# Patient Record
Sex: MALE | Race: BLACK OR AFRICAN AMERICAN | NOT HISPANIC OR LATINO | Employment: FULL TIME | URBAN - METROPOLITAN AREA
[De-identification: names, ages, dates, MRNs, and addresses within clinical notes are randomized per-mention and may not be internally consistent; named-entity substitution may affect disease eponyms.]

---

## 2018-02-15 ENCOUNTER — CLINICAL SUPPORT (OUTPATIENT)
Dept: OCCUPATIONAL MEDICINE | Facility: CLINIC | Age: 33
End: 2018-02-15

## 2018-02-15 PROCEDURE — 99499 UNLISTED E&M SERVICE: CPT | Mod: S$GLB,,, | Performed by: PREVENTIVE MEDICINE

## 2018-02-15 PROCEDURE — 80305 DRUG TEST PRSMV DIR OPT OBS: CPT | Mod: S$GLB,,, | Performed by: PREVENTIVE MEDICINE

## 2018-02-15 PROCEDURE — 99199 UNLISTED SPECIAL SVC PX/RPRT: CPT | Mod: S$GLB,,, | Performed by: PREVENTIVE MEDICINE

## 2024-03-02 ENCOUNTER — HOSPITAL ENCOUNTER (EMERGENCY)
Facility: HOSPITAL | Age: 39
Discharge: HOME | End: 2024-03-02
Attending: EMERGENCY MEDICINE
Payer: COMMERCIAL

## 2024-03-02 VITALS
BODY MASS INDEX: 25.46 KG/M2 | OXYGEN SATURATION: 99 % | HEIGHT: 68 IN | RESPIRATION RATE: 16 BRPM | DIASTOLIC BLOOD PRESSURE: 74 MMHG | SYSTOLIC BLOOD PRESSURE: 112 MMHG | HEART RATE: 74 BPM | WEIGHT: 168 LBS | TEMPERATURE: 98 F

## 2024-03-02 DIAGNOSIS — K64.9 HEMORRHOIDS, UNSPECIFIED HEMORRHOID TYPE: Primary | ICD-10-CM

## 2024-03-02 PROCEDURE — 99283 EMERGENCY DEPT VISIT LOW MDM: CPT

## 2024-03-02 RX ORDER — HYDROCORTISONE ACETATE 25 MG/1
25 SUPPOSITORY RECTAL 2 TIMES DAILY
Qty: 20 SUPPOSITORY | Refills: 0 | Status: SHIPPED | OUTPATIENT
Start: 2024-03-02 | End: 2024-03-06 | Stop reason: CLARIF

## 2024-03-02 NOTE — DISCHARGE INSTRUCTIONS
You can use positive worse twice a day to help reduce the size of hemorrhoids and swelling.  Take Metamucil daily to help with soft bowel movements.  Avoid straining or sitting on the toilet for long periods of time.  Follow up with primary care or general surgery.

## 2024-03-02 NOTE — ED PROVIDER NOTES
Encounter Date: 3/2/2024       History     Chief Complaint   Patient presents with    Abdominal Pain     Patient to abdominal pain and rectal pain onset two weeks ago.     39-year-old male with no significant past medical history presents to ED with reports of rectal pain that started couple of months ago.  Reports symptoms are intermittent and worse with straining to have a bowel movement.  Reported did notice some bleeding intermittently.  No improvement with over-the-counter hemorrhoid medication.  No relieving factors.    The history is provided by the patient.     Review of patient's allergies indicates:  No Known Allergies  History reviewed. No pertinent past medical history.  History reviewed. No pertinent surgical history.  History reviewed. No pertinent family history.  Social History     Tobacco Use    Smoking status: Every Day     Types: Cigars   Substance Use Topics    Drug use: Yes     Types: Marijuana     Review of Systems   Constitutional:  Negative for fever.   HENT:  Negative for sore throat.    Eyes: Negative.    Respiratory:  Negative for shortness of breath.    Cardiovascular:  Negative for chest pain.   Gastrointestinal:  Positive for abdominal pain, blood in stool and rectal pain. Negative for nausea and vomiting.   Endocrine: Negative.    Genitourinary:  Negative for dysuria.   Musculoskeletal:  Negative for back pain.   Skin:  Negative for rash.   Allergic/Immunologic: Negative.    Neurological:  Negative for weakness.   Hematological:  Does not bruise/bleed easily.   Psychiatric/Behavioral: Negative.         Physical Exam     Initial Vitals [03/02/24 1317]   BP Pulse Resp Temp SpO2   101/66 61 18 98 °F (36.7 °C) 100 %      MAP       --         Physical Exam    Nursing note and vitals reviewed.  Constitutional: He appears well-developed and well-nourished.   HENT:   Head: Normocephalic and atraumatic.   Eyes: EOM are normal.   Neck: Neck supple.   Normal range of motion.  Cardiovascular:   Normal rate and regular rhythm.           Pulmonary/Chest: No respiratory distress.   Abdominal: Abdomen is soft. He exhibits no distension. There is no abdominal tenderness.   Musculoskeletal:         General: Normal range of motion.      Cervical back: Normal range of motion and neck supple.     Neurological: He is alert and oriented to person, place, and time.   Skin: Skin is warm and dry.   Psychiatric: He has a normal mood and affect. Thought content normal.         ED Course   Procedures  Labs Reviewed - No data to display       Imaging Results    None          Medications - No data to display  Medical Decision Making  39-year-old male with no significant past medical history to ED for above complaints.  Small external hemorrhoid noted.  No active bleeding.  No anal fissures noted.  Abdomen was soft and nontender.  Will treat with Anusol and Metamucil.  Return precautions given.  Patient follow up with primary care or general surgery.    Risk  OTC drugs.  Prescription drug management.                                      Clinical Impression:  Final diagnoses:  [K64.9] Hemorrhoids, unspecified hemorrhoid type (Primary)          ED Disposition Condition    Discharge Stable          ED Prescriptions       Medication Sig Dispense Start Date End Date Auth. Provider    hydrocortisone (ANUSOL-HC) 25 mg suppository Place 1 suppository (25 mg total) rectally 2 (two) times daily. for 10 days 20 suppository 3/2/2024 3/12/2024 Danny Stone NP    psyllium (HYDROCIL) packet Take 1 packet by mouth once daily. 30 packet 3/2/2024 -- Danny Stone NP          Follow-up Information       Follow up With Specialties Details Why Contact [facility], Internal Medicine, Gynecology, Dental General Practice   [address] [facility] [address]  [phone]      Tiffany Johnston, DO Family Medicine   [address]  [address]  [facility] [address]  [phone]      Mariella  [facility]EL[facility]   [address]   [address] [address]  [phone]      Katie Hernandez MD General Surgery   [address] [facility] [address]  [phone]               Danny Stone NP  03/02/24 3297

## 2024-03-06 ENCOUNTER — OFFICE VISIT (OUTPATIENT)
Dept: PRIMARY CARE CLINIC | Facility: CLINIC | Age: 39
End: 2024-03-06
Payer: COMMERCIAL

## 2024-03-06 ENCOUNTER — LAB VISIT (OUTPATIENT)
Dept: LAB | Facility: HOSPITAL | Age: 39
End: 2024-03-06
Attending: NURSE PRACTITIONER
Payer: COMMERCIAL

## 2024-03-06 VITALS
SYSTOLIC BLOOD PRESSURE: 101 MMHG | WEIGHT: 169.19 LBS | DIASTOLIC BLOOD PRESSURE: 68 MMHG | HEART RATE: 73 BPM | HEIGHT: 69 IN | TEMPERATURE: 98 F | BODY MASS INDEX: 25.06 KG/M2 | OXYGEN SATURATION: 100 % | RESPIRATION RATE: 16 BRPM

## 2024-03-06 DIAGNOSIS — Z76.89 ENCOUNTER TO ESTABLISH CARE: Primary | ICD-10-CM

## 2024-03-06 DIAGNOSIS — Z13.1 SCREENING FOR DIABETES MELLITUS (DM): ICD-10-CM

## 2024-03-06 DIAGNOSIS — Z11.59 ENCOUNTER FOR HEPATITIS C SCREENING TEST FOR LOW RISK PATIENT: ICD-10-CM

## 2024-03-06 DIAGNOSIS — K64.8 OTHER HEMORRHOIDS: ICD-10-CM

## 2024-03-06 DIAGNOSIS — Z13.0 SCREENING FOR IRON DEFICIENCY ANEMIA: ICD-10-CM

## 2024-03-06 DIAGNOSIS — Z13.220 LIPID SCREENING: ICD-10-CM

## 2024-03-06 DIAGNOSIS — Z13.21 ENCOUNTER FOR VITAMIN DEFICIENCY SCREENING: ICD-10-CM

## 2024-03-06 DIAGNOSIS — Z11.4 ENCOUNTER FOR SCREENING FOR HIV: ICD-10-CM

## 2024-03-06 DIAGNOSIS — Z13.29 THYROID DISORDER SCREEN: ICD-10-CM

## 2024-03-06 DIAGNOSIS — E55.9 VITAMIN D DEFICIENCY: ICD-10-CM

## 2024-03-06 LAB
25(OH)D3+25(OH)D2 SERPL-MCNC: 10 NG/ML (ref 30–96)
ALBUMIN SERPL BCP-MCNC: 4.1 G/DL (ref 3.5–5.2)
ALP SERPL-CCNC: 62 U/L (ref 55–135)
ALT SERPL W/O P-5'-P-CCNC: 18 U/L (ref 10–44)
ANION GAP SERPL CALC-SCNC: 2 MMOL/L (ref 3–11)
AST SERPL-CCNC: 12 U/L (ref 10–40)
BASOPHILS # BLD AUTO: 0.02 K/UL (ref 0–0.2)
BASOPHILS NFR BLD: 0.4 % (ref 0–1.9)
BILIRUB SERPL-MCNC: 0.6 MG/DL (ref 0.1–1)
BUN SERPL-MCNC: 8 MG/DL (ref 6–20)
CALCIUM SERPL-MCNC: 9.8 MG/DL (ref 8.7–10.5)
CHLORIDE SERPL-SCNC: 109 MMOL/L (ref 95–110)
CHOLEST SERPL-MCNC: 155 MG/DL (ref 120–199)
CHOLEST/HDLC SERPL: 2.7 {RATIO} (ref 2–5)
CO2 SERPL-SCNC: 29 MMOL/L (ref 23–29)
CREAT SERPL-MCNC: 1.1 MG/DL (ref 0.5–1.4)
DIFFERENTIAL METHOD BLD: ABNORMAL
EOSINOPHIL # BLD AUTO: 0.1 K/UL (ref 0–0.5)
EOSINOPHIL NFR BLD: 1.8 % (ref 0–8)
ERYTHROCYTE [DISTWIDTH] IN BLOOD BY AUTOMATED COUNT: 12.1 % (ref 11.5–14.5)
EST. GFR  (NO RACE VARIABLE): >60 ML/MIN/1.73 M^2
ESTIMATED AVG GLUCOSE: 100 MG/DL (ref 68–131)
GLUCOSE SERPL-MCNC: 92 MG/DL (ref 70–110)
HBA1C MFR BLD: 5.1 % (ref 4–5.6)
HCT VFR BLD AUTO: 42.8 % (ref 40–54)
HCV AB SERPL QL IA: NORMAL
HDLC SERPL-MCNC: 57 MG/DL (ref 40–75)
HDLC SERPL: 36.8 % (ref 20–50)
HGB BLD-MCNC: 14.8 G/DL (ref 14–18)
HIV 1+2 AB+HIV1 P24 AG SERPL QL IA: NORMAL
IMM GRANULOCYTES # BLD AUTO: 0 K/UL (ref 0–0.04)
IMM GRANULOCYTES NFR BLD AUTO: 0 % (ref 0–0.5)
LDLC SERPL CALC-MCNC: 86.2 MG/DL (ref 63–159)
LYMPHOCYTES # BLD AUTO: 1.5 K/UL (ref 1–4.8)
LYMPHOCYTES NFR BLD: 30.5 % (ref 18–48)
MCH RBC QN AUTO: 33.6 PG (ref 27–31)
MCHC RBC AUTO-ENTMCNC: 34.6 G/DL (ref 32–36)
MCV RBC AUTO: 97 FL (ref 82–98)
MONOCYTES # BLD AUTO: 0.2 K/UL (ref 0.3–1)
MONOCYTES NFR BLD: 4.9 % (ref 4–15)
NEUTROPHILS # BLD AUTO: 3 K/UL (ref 1.8–7.7)
NEUTROPHILS NFR BLD: 62.4 % (ref 38–73)
NONHDLC SERPL-MCNC: 98 MG/DL
NRBC BLD-RTO: 0 /100 WBC
PLATELET # BLD AUTO: 220 K/UL (ref 150–450)
PMV BLD AUTO: 9.7 FL (ref 9.2–12.9)
POTASSIUM SERPL-SCNC: 4.2 MMOL/L (ref 3.5–5.1)
PROT SERPL-MCNC: 7.6 G/DL (ref 6–8.4)
RBC # BLD AUTO: 4.41 M/UL (ref 4.6–6.2)
SODIUM SERPL-SCNC: 140 MMOL/L (ref 136–145)
TRIGL SERPL-MCNC: 59 MG/DL (ref 30–150)
TSH SERPL DL<=0.005 MIU/L-ACNC: 0.96 UIU/ML (ref 0.4–4)
WBC # BLD AUTO: 4.88 K/UL (ref 3.9–12.7)

## 2024-03-06 PROCEDURE — 99999 PR PBB SHADOW E&M-EST. PATIENT-LVL III: CPT | Mod: PBBFAC,,, | Performed by: NURSE PRACTITIONER

## 2024-03-06 PROCEDURE — 80061 LIPID PANEL: CPT | Performed by: NURSE PRACTITIONER

## 2024-03-06 PROCEDURE — 84443 ASSAY THYROID STIM HORMONE: CPT | Performed by: NURSE PRACTITIONER

## 2024-03-06 PROCEDURE — 82306 VITAMIN D 25 HYDROXY: CPT | Performed by: NURSE PRACTITIONER

## 2024-03-06 PROCEDURE — 1160F RVW MEDS BY RX/DR IN RCRD: CPT | Mod: CPTII,S$GLB,, | Performed by: NURSE PRACTITIONER

## 2024-03-06 PROCEDURE — 99203 OFFICE O/P NEW LOW 30 MIN: CPT | Mod: S$GLB,,, | Performed by: NURSE PRACTITIONER

## 2024-03-06 PROCEDURE — 3078F DIAST BP <80 MM HG: CPT | Mod: CPTII,S$GLB,, | Performed by: NURSE PRACTITIONER

## 2024-03-06 PROCEDURE — 87389 HIV-1 AG W/HIV-1&-2 AB AG IA: CPT | Performed by: NURSE PRACTITIONER

## 2024-03-06 PROCEDURE — 80053 COMPREHEN METABOLIC PANEL: CPT | Performed by: NURSE PRACTITIONER

## 2024-03-06 PROCEDURE — 1159F MED LIST DOCD IN RCRD: CPT | Mod: CPTII,S$GLB,, | Performed by: NURSE PRACTITIONER

## 2024-03-06 PROCEDURE — 86803 HEPATITIS C AB TEST: CPT | Performed by: NURSE PRACTITIONER

## 2024-03-06 PROCEDURE — 3008F BODY MASS INDEX DOCD: CPT | Mod: CPTII,S$GLB,, | Performed by: NURSE PRACTITIONER

## 2024-03-06 PROCEDURE — 83036 HEMOGLOBIN GLYCOSYLATED A1C: CPT | Performed by: NURSE PRACTITIONER

## 2024-03-06 PROCEDURE — 85025 COMPLETE CBC W/AUTO DIFF WBC: CPT | Performed by: NURSE PRACTITIONER

## 2024-03-06 PROCEDURE — 3074F SYST BP LT 130 MM HG: CPT | Mod: CPTII,S$GLB,, | Performed by: NURSE PRACTITIONER

## 2024-03-06 RX ORDER — HYDROCORTISONE ACETATE PRAMOXINE HCL 2.5; 1 G/100G; G/100G
CREAM TOPICAL 3 TIMES DAILY
Qty: 30 G | Refills: 2 | Status: ON HOLD | OUTPATIENT
Start: 2024-03-06 | End: 2024-05-20 | Stop reason: HOSPADM

## 2024-03-06 RX ORDER — HYDROCORTISONE ACETATE PRAMOXINE HCL 1; 1 G/100G; G/100G
CREAM TOPICAL 3 TIMES DAILY
Qty: 30 G | Refills: 2 | Status: SHIPPED | OUTPATIENT
Start: 2024-03-06 | End: 2024-03-06 | Stop reason: CLARIF

## 2024-03-06 NOTE — PROGRESS NOTES
[facility] Note    HPI:  Lb Soto is a 39 y.o. male who presents today for Establish Care and Hemorrhoids (Pt here to establish care/ hemorrhoid)      Review of Systems   Constitutional: Negative.    HENT: Negative.     Eyes: Negative.    Respiratory: Negative.     Cardiovascular: Negative.    Gastrointestinal: Negative.         Hemorrhoids   Genitourinary: Negative.    Musculoskeletal: Negative.    Skin: Negative.    Neurological: Negative.    Endo/Heme/Allergies: Negative.    Psychiatric/Behavioral: Negative.        A review of systems was performed and was negative except as noted above.    I personally reviewed allergies, past medical, surgical, social and family history and updated as appropriate.    Medications:    Current Outpatient Medications:     hydrocortisone (ANUSOL-HC) 25 mg suppository, Place 1 suppository (25 mg total) rectally 2 (two) times daily. for 10 days, Disp: 20 suppository, Rfl: 0    psyllium (HYDROCIL) packet, Take 1 packet by mouth once daily., Disp: 30 packet, Rfl: 0    pramoxine-hydrocortisone (PROCTOCREAM-HC) 1-1 % rectal cream, Place rectally 3 (three) times daily. for 7 days, Disp: 30 g, Rfl: 2     Health Maintenance:    There is no immunization history on file for this patient.   Health Maintenance   Topic Date Due    Hepatitis C Screening  Never done    Lipid Panel  Never done    TETANUS VACCINE  Never done     The patient has no Health Maintenance topics of status Not Due  Health Maintenance Due   Topic Date Due    Hepatitis C Screening  Never done    Lipid Panel  Never done    COVID-19 Vaccine (1) Never done    Pneumococcal Vaccines (Age 0-64) (1 of 2 - PCV) Never done    HIV Screening  Never done    TETANUS VACCINE  Never done    Influenza Vaccine (1) Never done       PHYSICAL EXAM:  Vitals:    03/06/24 0816   BP: 101/68   BP Location: Left arm   Patient Position: Sitting   BP Method: Large (Automatic)   Pulse: 73   Resp: 16   Temp: 98.1 °F (36.7  "°C)   TempSrc: Oral   SpO2: 100%   Weight: 76.7 kg (169 lb 3.2 oz)   Height: 5' 9" (1.753 m)     Body mass index is 24.99 kg/m².  Physical Exam  Vitals and nursing note reviewed.   Constitutional:       Appearance: Normal appearance. He is normal weight.   HENT:      Head: Normocephalic.      Right Ear: Tympanic membrane, ear canal and external ear normal.      Left Ear: Tympanic membrane, ear canal and external ear normal.      Nose: Nose normal.      Mouth/Throat:      Mouth: Mucous membranes are moist.   Cardiovascular:      Rate and Rhythm: Normal rate and regular rhythm.      Pulses: Normal pulses.      Heart sounds: Normal heart sounds.   Pulmonary:      Effort: Pulmonary effort is normal.      Breath sounds: Normal breath sounds.   Abdominal:      General: Abdomen is flat.   Musculoskeletal:         General: Normal range of motion.      Cervical back: Normal range of motion.   Skin:     General: Skin is warm and dry.   Neurological:      General: No focal deficit present.      Mental Status: He is alert and oriented to person, place, and time.          ASSESSMENT/PLAN:  1. Encounter to establish care    2. Other hemorrhoids  -     pramoxine-hydrocortisone (PROCTOCREAM-HC) 1-1 % rectal cream; Place rectally 3 (three) times daily. for 7 days  Dispense: 30 g; Refill: 2    3. Encounter for hepatitis C screening test for low risk patient  -     Hepatitis C Antibody; Future; Expected date: 03/06/2024    4. Encounter for screening for HIV  -     HIV 1/2 Ag/Ab (4th Gen); Future; Expected date: 03/06/2024    5. Screening for iron deficiency anemia  -     CBC Auto Differential; Future; Expected date: 03/06/2024    6. Lipid screening  -     Lipid Panel; Future; Expected date: 03/06/2024    7. Thyroid disorder screen  -     TSH; Future; Expected date: 03/06/2024    8. Screening for diabetes mellitus (DM)  -     Comprehensive Metabolic Panel; Future; Expected date: 03/06/2024  -     Hemoglobin A1C; Future; Expected date: " 03/06/2024    9. Encounter for vitamin deficiency screening  -     Misc Sendout Test, Blood Vitamin D; Future; Expected date: 03/06/2024        Other than changes above, continue current medications and maintain follow up with specialists.      Follow up in about 6 months (around 9/6/2024).   No results found for this or any previous visit (from the past 2016 hour(s)).      Madalyn Wolff, VERA  [facility]

## 2024-03-07 DIAGNOSIS — E55.9 VITAMIN D DEFICIENCY: Primary | ICD-10-CM

## 2024-03-07 RX ORDER — ERGOCALCIFEROL 1.25 MG/1
50000 CAPSULE ORAL
Qty: 4 CAPSULE | Refills: 1 | Status: SHIPPED | OUTPATIENT
Start: 2024-03-07 | End: 2024-05-06

## 2024-03-15 DIAGNOSIS — K64.8 OTHER HEMORRHOIDS: Primary | ICD-10-CM

## 2024-03-21 DIAGNOSIS — K64.8 OTHER HEMORRHOIDS: Primary | ICD-10-CM

## 2024-03-21 RX ORDER — HYDROCORTISONE 25 MG/G
CREAM TOPICAL 2 TIMES DAILY
Qty: 28 G | Refills: 0 | Status: SHIPPED | OUTPATIENT
Start: 2024-03-21 | End: 2024-05-13 | Stop reason: SDUPTHER

## 2024-03-22 ENCOUNTER — OFFICE VISIT (OUTPATIENT)
Dept: SURGERY | Facility: CLINIC | Age: 39
End: 2024-03-22
Payer: COMMERCIAL

## 2024-03-22 VITALS
BODY MASS INDEX: 24.59 KG/M2 | HEART RATE: 66 BPM | DIASTOLIC BLOOD PRESSURE: 56 MMHG | OXYGEN SATURATION: 95 % | WEIGHT: 166 LBS | SYSTOLIC BLOOD PRESSURE: 106 MMHG | HEIGHT: 69 IN

## 2024-03-22 DIAGNOSIS — K64.8 OTHER HEMORRHOIDS: ICD-10-CM

## 2024-03-22 DIAGNOSIS — K60.2 ANAL FISSURE: Primary | ICD-10-CM

## 2024-03-22 PROCEDURE — 99999 PR PBB SHADOW E&M-EST. PATIENT-LVL IV: CPT | Mod: PBBFAC,,, | Performed by: STUDENT IN AN ORGANIZED HEALTH CARE EDUCATION/TRAINING PROGRAM

## 2024-03-22 PROCEDURE — 1159F MED LIST DOCD IN RCRD: CPT | Mod: CPTII,S$GLB,, | Performed by: STUDENT IN AN ORGANIZED HEALTH CARE EDUCATION/TRAINING PROGRAM

## 2024-03-22 PROCEDURE — 3074F SYST BP LT 130 MM HG: CPT | Mod: CPTII,S$GLB,, | Performed by: STUDENT IN AN ORGANIZED HEALTH CARE EDUCATION/TRAINING PROGRAM

## 2024-03-22 PROCEDURE — 3044F HG A1C LEVEL LT 7.0%: CPT | Mod: CPTII,S$GLB,, | Performed by: STUDENT IN AN ORGANIZED HEALTH CARE EDUCATION/TRAINING PROGRAM

## 2024-03-22 PROCEDURE — 3078F DIAST BP <80 MM HG: CPT | Mod: CPTII,S$GLB,, | Performed by: STUDENT IN AN ORGANIZED HEALTH CARE EDUCATION/TRAINING PROGRAM

## 2024-03-22 PROCEDURE — 99203 OFFICE O/P NEW LOW 30 MIN: CPT | Mod: S$GLB,,, | Performed by: STUDENT IN AN ORGANIZED HEALTH CARE EDUCATION/TRAINING PROGRAM

## 2024-03-22 PROCEDURE — 3008F BODY MASS INDEX DOCD: CPT | Mod: CPTII,S$GLB,, | Performed by: STUDENT IN AN ORGANIZED HEALTH CARE EDUCATION/TRAINING PROGRAM

## 2024-03-25 PROBLEM — K60.2 ANAL FISSURE: Status: ACTIVE | Noted: 2024-03-25

## 2024-03-25 NOTE — H&P
"[facility] H&P      Consult: anal pain  Consulting Service: Self   Chief Complaint: anal pain    HPI: Pt is a 39 y.o. male with anal fissure and BRBPR.     PMH:   Past Medical History:   Diagnosis Date    Unspecified hemorrhoids      PSH:   Past Surgical History:   Procedure Laterality Date    WISDOM TOOTH EXTRACTION Bilateral      Meds: See medication list;  No anticoagulation  ALL: Patient has no known allergies.  FHX: non contributory   SOC:   Social History     Socioeconomic History    Marital status: Single    Number of children: 3   Tobacco Use    Smoking status: Former     Types: Cigars    Smokeless tobacco: Never   Substance and Sexual Activity    Alcohol use: Never    Drug use: Not Currently     Types: Marijuana    Sexual activity: Yes     Partners: Female     ROS: Review of Systems   Constitutional:  Negative for chills, fever, malaise/fatigue and weight loss.   Respiratory:  Negative for cough.    Cardiovascular:  Negative for chest pain.   Gastrointestinal:  Negative for abdominal pain, blood in stool, constipation, diarrhea, heartburn, melena, nausea and vomiting.   All other systems reviewed and are negative.      Physical Exam:  BP (!) 106/56 (BP Location: Right arm, Patient Position: Sitting, BP Method: Large (Automatic))   Pulse 66   Ht 5' 9" (1.753 m)   Wt 75.3 kg (166 lb 0.1 oz)   SpO2 95%   BMI 24.51 kg/m²   Physical Exam  Constitutional:       General: He is not in acute distress.     Appearance: He is not ill-appearing or toxic-appearing.   Cardiovascular:      Rate and Rhythm: Normal rate and regular rhythm.   Pulmonary:      Effort: Pulmonary effort is normal. No respiratory distress.   Abdominal:      General: There is no distension.      Palpations: Abdomen is soft.      Tenderness: There is no abdominal tenderness. There is no guarding or rebound.   Genitourinary:     Comments: Post midline internal hemorrhoid with skin tag   Unable to perform JOHN due to " pain  Skin:     General: Skin is warm and dry.      Capillary Refill: Capillary refill takes less than 2 seconds.   Neurological:      Mental Status: He is alert.           ]    A/P: Pt is a 39 y.o. male who presents with anal fissure  Diltiazem gel TID   RTC 2 weeks   Bowel regimen given         Katie Hernandez MD  [phone]

## 2024-04-11 ENCOUNTER — OFFICE VISIT (OUTPATIENT)
Dept: SURGERY | Facility: CLINIC | Age: 39
End: 2024-04-11
Payer: COMMERCIAL

## 2024-04-11 VITALS
OXYGEN SATURATION: 98 % | DIASTOLIC BLOOD PRESSURE: 56 MMHG | HEIGHT: 69 IN | HEART RATE: 66 BPM | BODY MASS INDEX: 25.37 KG/M2 | SYSTOLIC BLOOD PRESSURE: 107 MMHG | WEIGHT: 171.31 LBS

## 2024-04-11 DIAGNOSIS — K60.2 ANAL FISSURE: Primary | ICD-10-CM

## 2024-04-11 PROCEDURE — 99999 PR PBB SHADOW E&M-EST. PATIENT-LVL III: CPT | Mod: PBBFAC,,, | Performed by: STUDENT IN AN ORGANIZED HEALTH CARE EDUCATION/TRAINING PROGRAM

## 2024-04-11 PROCEDURE — 99499 UNLISTED E&M SERVICE: CPT | Mod: S$GLB,,, | Performed by: STUDENT IN AN ORGANIZED HEALTH CARE EDUCATION/TRAINING PROGRAM

## 2024-04-15 NOTE — PROGRESS NOTES
"[facility] Progress Note    HPI:  Lb Soto is a 39 y.o. male with history of anal fissure who presents for follow up.  Still with severe pain with bowel movements, but is no longer constant like before.     ROS:  Negative except above    PE:  Vitals:    04/11/24 1650   BP: (!) 107/56   Pulse: 66   SpO2: 98%   Weight: 77.7 kg (171 lb 4.8 oz)   Height: 5' 9" (1.753 m)        Gen: Alert and oriented x3, judgement intact, NAD  CV: RRR  Resp: CTAB; breaths non-labored and symmetrical  Abd: Soft, NT, ND, BS+  Extremities: No c/c/e    A/P:  39 y.o. male with anal fissure who presents for follow up  -Continue diltiazem gel BID   -Add mineral oil  -RTC 3 weeks     Katie Hernandez MD  General Surgery   [phone]        4/15/2024  1:00 AM      "

## 2024-04-30 ENCOUNTER — OFFICE VISIT (OUTPATIENT)
Dept: SURGERY | Facility: CLINIC | Age: 39
End: 2024-04-30
Payer: COMMERCIAL

## 2024-04-30 VITALS
OXYGEN SATURATION: 98 % | HEART RATE: 66 BPM | TEMPERATURE: 98 F | DIASTOLIC BLOOD PRESSURE: 80 MMHG | BODY MASS INDEX: 25.83 KG/M2 | WEIGHT: 174.38 LBS | SYSTOLIC BLOOD PRESSURE: 116 MMHG | HEIGHT: 69 IN

## 2024-04-30 DIAGNOSIS — K60.2 ANAL FISSURE: Primary | ICD-10-CM

## 2024-04-30 DIAGNOSIS — Z01.818 PRE-OP TESTING: ICD-10-CM

## 2024-04-30 PROCEDURE — 1159F MED LIST DOCD IN RCRD: CPT | Mod: CPTII,S$GLB,, | Performed by: STUDENT IN AN ORGANIZED HEALTH CARE EDUCATION/TRAINING PROGRAM

## 2024-04-30 PROCEDURE — 3079F DIAST BP 80-89 MM HG: CPT | Mod: CPTII,S$GLB,, | Performed by: STUDENT IN AN ORGANIZED HEALTH CARE EDUCATION/TRAINING PROGRAM

## 2024-04-30 PROCEDURE — 3074F SYST BP LT 130 MM HG: CPT | Mod: CPTII,S$GLB,, | Performed by: STUDENT IN AN ORGANIZED HEALTH CARE EDUCATION/TRAINING PROGRAM

## 2024-04-30 PROCEDURE — 3008F BODY MASS INDEX DOCD: CPT | Mod: CPTII,S$GLB,, | Performed by: STUDENT IN AN ORGANIZED HEALTH CARE EDUCATION/TRAINING PROGRAM

## 2024-04-30 PROCEDURE — 3044F HG A1C LEVEL LT 7.0%: CPT | Mod: CPTII,S$GLB,, | Performed by: STUDENT IN AN ORGANIZED HEALTH CARE EDUCATION/TRAINING PROGRAM

## 2024-04-30 PROCEDURE — 99214 OFFICE O/P EST MOD 30 MIN: CPT | Mod: S$GLB,,, | Performed by: STUDENT IN AN ORGANIZED HEALTH CARE EDUCATION/TRAINING PROGRAM

## 2024-04-30 PROCEDURE — 99999 PR PBB SHADOW E&M-EST. PATIENT-LVL IV: CPT | Mod: PBBFAC,,, | Performed by: STUDENT IN AN ORGANIZED HEALTH CARE EDUCATION/TRAINING PROGRAM

## 2024-05-05 RX ORDER — SODIUM CHLORIDE 9 MG/ML
INJECTION, SOLUTION INTRAVENOUS CONTINUOUS
Status: CANCELLED | OUTPATIENT
Start: 2024-05-05

## 2024-05-05 RX ORDER — CEFAZOLIN SODIUM 2 G/50ML
2 SOLUTION INTRAVENOUS
Status: CANCELLED | OUTPATIENT
Start: 2024-05-05

## 2024-05-05 RX ORDER — ONDANSETRON HYDROCHLORIDE 2 MG/ML
4 INJECTION, SOLUTION INTRAVENOUS EVERY 12 HOURS PRN
Status: CANCELLED | OUTPATIENT
Start: 2024-05-05

## 2024-05-05 RX ORDER — METOCLOPRAMIDE HYDROCHLORIDE 5 MG/ML
5 INJECTION INTRAMUSCULAR; INTRAVENOUS EVERY 6 HOURS PRN
Status: CANCELLED | OUTPATIENT
Start: 2024-05-05

## 2024-05-06 NOTE — H&P (VIEW-ONLY)
"[facility] Progress Note    HPI:  Lb Soto is a 39 y.o. male with history of anal fissure who presents for follow up.  Still experiencing pain with larger bowel movements, or movements after a delay in finding a rest room.  Wishes to explore surgical options.     ROS:  Negative except above    PE:  Vitals:    04/30/24 1550   BP: 116/80   BP Location: Left arm   Patient Position: Sitting   BP Method: Medium (Automatic)   Pulse: 66   Temp: 98.2 °F (36.8 °C)   TempSrc: Oral   SpO2: 98%   Weight: 79.1 kg (174 lb 6.1 oz)   Height: 5' 9" (1.753 m)        Gen: Alert and oriented x3, judgement intact, NAD  CV: RRR  Resp: CTAB; breaths non-labored and symmetrical  Abd: Soft, NT, ND, BS+  Extremities: No c/c/e    A/P:  39 y.o. male with anal fissure who presents for follow up  -to OR 5/29 for EUA with lateral sphincterotomy   -Informed consent obtained  -Continue Diltiazem gel   -Strict bowel regimen  -pre-op    Katie Hernandez MD  General Surgery   [phone]        5/5/2024  1:00 AM        "

## 2024-05-06 NOTE — PROGRESS NOTES
"[facility]  [facility] Progress Note    HPI:  Lb Soto is a 39 y.o. male with history of anal fissure who presents for follow up.  Still experiencing pain with larger bowel movements, or movements after a delay in finding a rest room.  Wishes to explore surgical options.     ROS:  Negative except above    PE:  Vitals:    04/30/24 1550   BP: 116/80   BP Location: Left arm   Patient Position: Sitting   BP Method: Medium (Automatic)   Pulse: 66   Temp: 98.2 °F (36.8 °C)   TempSrc: Oral   SpO2: 98%   Weight: 79.1 kg (174 lb 6.1 oz)   Height: 5' 9" (1.753 m)        Gen: Alert and oriented x3, judgement intact, NAD  CV: RRR  Resp: CTAB; breaths non-labored and symmetrical  Abd: Soft, NT, ND, BS+  Extremities: No c/c/e    A/P:  39 y.o. male with anal fissure who presents for follow up  -to OR 5/29 for EUA with lateral sphincterotomy   -Informed consent obtained  -Continue Diltiazem gel   -Strict bowel regimen  -pre-op    Katie Hernandez MD  General Surgery   [phone]        5/5/2024  1:00 AM        "

## 2024-05-09 ENCOUNTER — ANESTHESIA EVENT (OUTPATIENT)
Dept: SURGERY | Facility: HOSPITAL | Age: 39
End: 2024-05-09
Payer: COMMERCIAL

## 2024-05-09 NOTE — DISCHARGE INSTRUCTIONS
"         PREPARING SKIN BEFORE SURGERY      Preparing skin before surgery can reduce the risk of infection at surgical site. To make the process easier this facility has chosen disposable cloths moistened with rinse free 2% Chlorhexidine Gluconate antiseptic solution. The steps below outline the prepping process and should be carefully followed.        DIRECTIONS:     Shower or bathe the night prior to surgery, wait at least 2 hours before prepping skin with disposable cloths. Once  prepping begins do not apply lotions, moisturizers or make up.         * Open prep wipes by holding top of package in one hand and and lift flap on backside with the other hand.      * Prep from the jaw line down using all cloths in package.       * Avoid contact with eyes, ears and mouth.     Prep is used on:    1. Neck, shoulders, arms, underarms and chest.    2. Both hands, between fingers.    3. Abdomen, groin and perineum.     4. Right leg, foot and between toes.    5. Left leg, foot and between toes.    6. Back, back of neck and buttocks.     Allow areas to air dry 1 minute. Do not wipe off or rinse. It is normal for the skin to have a temporary "tacky" feel for several minutes after the antiseptic solutions is applied.     Shower or bathe the morning of surgery.        BEFORE THE PROCEDURE:    REPORT ANY CHANGE IN YOUR PHYSICAL CONDITION TO YOUR DOCTOR IMMEDIATELY.  SELF ISOLATE AND CHECK TEMPERATURE DAILY, IF TEMP OVER 100, CALL PHYSICIAN IMMEDIATELY.  TRY TO REFRAIN FROM SMOKING AND ALCOHOL 72 HOURS BEFORE YOUR PROCEDURE.   DO NOT EAT OR DRINK ANYTHING AFTER MIDNIGHT THE NIGHT BEFORE YOUR PROCEDURE.  NO MAKE UP, NAIL POLISH OR JEWELRY.      SURGERY PREP INSTRUCTIONS    CHECK INTO FIRST FLOOR REGISTRATION DESK AT 11 AM.    DAY OF YOUR PROCEDURE:    TAKE BLOOD PRESSURE MEDICATIONS THE MORNING OF YOUR PROCEDURE, WITH SMALL SIPS WATER, AS DIRECTED BY YOUR PHYSICIAN.   DO NOT TAKE ANY DIABETIC MEDICATIONS UNLESS DIRECTED TO DO SO BY " YOUR PHYSICIAN.   CONTACT LENSES AND DENTURES MUST BE REMOVED.  A RESPONSIBLE ADULT MUST ACCOMPANY YOU HOME UPON DISCHARGE.   ONLY 1 VISITOR ALLOWED PER ROOM.     YOUR THOUGHTS AND OPINIONS HELP US TO BETTER SERVE YOU.     PLEASE PARTICIPATE IN SURVEYS ABOUT YOUR CARE.    THANK YOU FOR CHOOSING [facility].

## 2024-05-09 NOTE — ANESTHESIA PREPROCEDURE EVALUATION
05/09/2024  Lb Soto is a 39 y.o., male.      Pre-op Assessment    I have reviewed the Patient Summary Reports.    I have reviewed the NPO Status.   I have reviewed the Medications.     Review of Systems  Anesthesia Hx:  No problems with previous Anesthesia             Denies Family Hx of Anesthesia complications.    Denies Personal Hx of Anesthesia complications.                    Social:  Former Smoker       Cardiovascular:  Cardiovascular Normal                  ECG has been reviewed.                          Pulmonary:  Pulmonary Normal                       Renal/:  Renal/ Normal                 Hepatic/GI:  Hepatic/GI Normal                 Neurological:  Neurology Normal                                      Endocrine:  Endocrine Normal              Lab Results   Component Value Date    WBC 4.88 03/06/2024    HGB 14.8 03/06/2024    HCT 42.8 03/06/2024    MCV 97 03/06/2024     03/06/2024      CMP  Sodium   Date Value Ref Range Status   03/06/2024 140 136 - 145 mmol/L Final     Potassium   Date Value Ref Range Status   03/06/2024 4.2 3.5 - 5.1 mmol/L Final     Chloride   Date Value Ref Range Status   03/06/2024 109 95 - 110 mmol/L Final     CO2   Date Value Ref Range Status   03/06/2024 29 23 - 29 mmol/L Final     Glucose   Date Value Ref Range Status   03/06/2024 92 70 - 110 mg/dL Final     BUN   Date Value Ref Range Status   03/06/2024 8 6 - 20 mg/dL Final     Creatinine   Date Value Ref Range Status   03/06/2024 1.1 0.5 - 1.4 mg/dL Final     Calcium   Date Value Ref Range Status   03/06/2024 9.8 8.7 - 10.5 mg/dL Final     Total Protein   Date Value Ref Range Status   03/06/2024 7.6 6.0 - 8.4 g/dL Final     Albumin   Date Value Ref Range Status   03/06/2024 4.1 3.5 - 5.2 g/dL Final     Total Bilirubin   Date Value Ref Range Status   03/06/2024 0.6 0.1 - 1.0 mg/dL Final      Comment:     For infants and newborns, interpretation of results should be based  on gestational age, weight and in agreement with clinical  observations.    Premature Infant recommended reference ranges:  Up to 24 hours.............<8.0 mg/dL  Up to 48 hours............<12.0 mg/dL  3-5 days..................<15.0 mg/dL  6-29 days.................<15.0 mg/dL    For patients on Eltrombopag therapy, use of Dimension [place] TBIL is   not   recommended.       Alkaline Phosphatase   Date Value Ref Range Status   03/06/2024 62 55 - 135 U/L Final     AST   Date Value Ref Range Status   03/06/2024 12 10 - 40 U/L Final     ALT   Date Value Ref Range Status   03/06/2024 18 10 - 44 U/L Final     Anion Gap   Date Value Ref Range Status   03/06/2024 2 (L) 3 - 11 mmol/L Final     eGFR   Date Value Ref Range Status   03/06/2024 >60.0 >60 mL/min/1.73 m^2 Final        Physical Exam  General: Well nourished    Airway:  Mallampati: II / I  Mouth Opening: Normal  TM Distance: Normal  Tongue: Normal  Neck ROM: Normal ROM    Dental:  Intact    Chest/Lungs:  Clear to auscultation    Heart:  Rate: Normal  Rhythm: Regular Rhythm  Sounds: Normal        Anesthesia Plan  Type of Anesthesia, risks & benefits discussed:    Anesthesia Type: Gen ETT, Gen Supraglottic Airway, MAC  Intra-op Monitoring Plan: Standard ASA Monitors  Post Op Pain Control Plan: multimodal analgesia  Induction:  IV  Airway Plan: Direct  Informed Consent: Informed consent signed with the Patient and all parties understand the risks and agree with anesthesia plan.  All questions answered.   ASA Score: 2  Day of Surgery Review of History & Physical: I have interviewed and examined the patient. I have reviewed the patient's H&P dated: There are no significant changes.     Ready For Surgery From Anesthesia Perspective.     .

## 2024-05-10 ENCOUNTER — HOSPITAL ENCOUNTER (OUTPATIENT)
Dept: PREADMISSION TESTING | Facility: HOSPITAL | Age: 39
Discharge: HOME | End: 2024-05-10
Payer: COMMERCIAL

## 2024-05-13 ENCOUNTER — ANESTHESIA (OUTPATIENT)
Dept: SURGERY | Facility: HOSPITAL | Age: 39
End: 2024-05-13
Payer: COMMERCIAL

## 2024-05-13 DIAGNOSIS — K64.8 OTHER HEMORRHOIDS: ICD-10-CM

## 2024-05-13 RX ORDER — HYDROCORTISONE 25 MG/G
CREAM TOPICAL 2 TIMES DAILY
Qty: 28 G | Refills: 0 | Status: ON HOLD | OUTPATIENT
Start: 2024-05-13 | End: 2024-05-20 | Stop reason: HOSPADM

## 2024-05-15 NOTE — DISCHARGE INSTRUCTIONS
"         PREPARING SKIN BEFORE SURGERY      Preparing skin before surgery can reduce the risk of infection at surgical site. To make the process easier this facility has chosen disposable cloths moistened with rinse free 2% Chlorhexidine Gluconate antiseptic solution. The steps below outline the prepping process and should be carefully followed.        DIRECTIONS:     Shower or bathe the night prior to surgery, wait at least 2 hours before prepping skin with disposable cloths. Once  prepping begins do not apply lotions, moisturizers or make up.         * Open prep wipes by holding top of package in one hand and and lift flap on backside with the other hand.      * Prep from the jaw line down using all cloths in package.       * Avoid contact with eyes, ears and mouth.     Prep is used on:    1. Neck, shoulders, arms, underarms and chest.    2. Both hands, between fingers.    3. Abdomen, groin and perineum.     4. Right leg, foot and between toes.    5. Left leg, foot and between toes.    6. Back, back of neck and buttocks.     Allow areas to air dry 1 minute. Do not wipe off or rinse. It is normal for the skin to have a temporary "tacky" feel for several minutes after the antiseptic solutions is applied.     Shower or bathe the morning of surgery.        BEFORE THE PROCEDURE:    REPORT ANY CHANGE IN YOUR PHYSICAL CONDITION TO YOUR DOCTOR IMMEDIATELY.  SELF ISOLATE AND CHECK TEMPERATURE DAILY, IF TEMP OVER 100, CALL PHYSICIAN IMMEDIATELY.  TRY TO REFRAIN FROM SMOKING AND ALCOHOL 72 HOURS BEFORE YOUR PROCEDURE.   DO NOT EAT OR DRINK ANYTHING AFTER MIDNIGHT THE NIGHT BEFORE YOUR PROCEDURE.  NO MAKE UP, NAIL POLISH OR JEWELRY.      SURGERY PREP INSTRUCTIONS    Check into first floor registration desk at 11 am    DAY OF YOUR PROCEDURE:    TAKE BLOOD PRESSURE MEDICATIONS THE MORNING OF YOUR PROCEDURE, WITH SMALL SIPS WATER, AS DIRECTED BY YOUR PHYSICIAN.   DO NOT TAKE ANY DIABETIC MEDICATIONS UNLESS DIRECTED TO DO SO BY " YOUR PHYSICIAN.   CONTACT LENSES AND DENTURES MUST BE REMOVED.  A RESPONSIBLE ADULT MUST ACCOMPANY YOU HOME UPON DISCHARGE.   ONLY 1 VISITOR ALLOWED PER ROOM.     YOUR THOUGHTS AND OPINIONS HELP US TO BETTER SERVE YOU.     PLEASE PARTICIPATE IN SURVEYS ABOUT YOUR CARE.    THANK YOU FOR CHOOSING [facility].

## 2024-05-16 ENCOUNTER — HOSPITAL ENCOUNTER (OUTPATIENT)
Dept: PREADMISSION TESTING | Facility: HOSPITAL | Age: 39
Discharge: HOME | End: 2024-05-16
Attending: STUDENT IN AN ORGANIZED HEALTH CARE EDUCATION/TRAINING PROGRAM
Payer: COMMERCIAL

## 2024-05-16 ENCOUNTER — HOSPITAL ENCOUNTER (OUTPATIENT)
Dept: PULMONOLOGY | Facility: HOSPITAL | Age: 39
Discharge: HOME | End: 2024-05-16
Attending: STUDENT IN AN ORGANIZED HEALTH CARE EDUCATION/TRAINING PROGRAM
Payer: COMMERCIAL

## 2024-05-16 VITALS — BODY MASS INDEX: 25.92 KG/M2 | WEIGHT: 175 LBS | HEIGHT: 69 IN

## 2024-05-16 DIAGNOSIS — Z01.818 PRE-OP TESTING: ICD-10-CM

## 2024-05-16 DIAGNOSIS — K60.2 ANAL FISSURE: ICD-10-CM

## 2024-05-16 LAB
OHS QRS DURATION: 78 MS
OHS QTC CALCULATION: 371 MS

## 2024-05-16 PROCEDURE — 93005 ELECTROCARDIOGRAM TRACING: CPT

## 2024-05-16 PROCEDURE — 93010 ELECTROCARDIOGRAM REPORT: CPT | Mod: ,,, | Performed by: INTERNAL MEDICINE

## 2024-05-20 ENCOUNTER — HOSPITAL ENCOUNTER (OUTPATIENT)
Facility: HOSPITAL | Age: 39
Discharge: HOME | End: 2024-05-21
Attending: STUDENT IN AN ORGANIZED HEALTH CARE EDUCATION/TRAINING PROGRAM | Admitting: STUDENT IN AN ORGANIZED HEALTH CARE EDUCATION/TRAINING PROGRAM
Payer: COMMERCIAL

## 2024-05-20 DIAGNOSIS — K64.8 OTHER HEMORRHOIDS: ICD-10-CM

## 2024-05-20 DIAGNOSIS — G89.18 POST-OPERATIVE PAIN: ICD-10-CM

## 2024-05-20 DIAGNOSIS — K60.2 ANAL FISSURE: Primary | ICD-10-CM

## 2024-05-20 PROCEDURE — 63600175 PHARM REV CODE 636 W HCPCS: Performed by: STUDENT IN AN ORGANIZED HEALTH CARE EDUCATION/TRAINING PROGRAM

## 2024-05-20 PROCEDURE — 63600175 PHARM REV CODE 636 W HCPCS: Performed by: NURSE ANESTHETIST, CERTIFIED REGISTERED

## 2024-05-20 PROCEDURE — 71000033 HC RECOVERY, INTIAL HOUR: Performed by: STUDENT IN AN ORGANIZED HEALTH CARE EDUCATION/TRAINING PROGRAM

## 2024-05-20 PROCEDURE — 27201423 OPTIME MED/SURG SUP & DEVICES STERILE SUPPLY: Performed by: STUDENT IN AN ORGANIZED HEALTH CARE EDUCATION/TRAINING PROGRAM

## 2024-05-20 PROCEDURE — 36000706: Performed by: STUDENT IN AN ORGANIZED HEALTH CARE EDUCATION/TRAINING PROGRAM

## 2024-05-20 PROCEDURE — 36000707: Performed by: STUDENT IN AN ORGANIZED HEALTH CARE EDUCATION/TRAINING PROGRAM

## 2024-05-20 PROCEDURE — 46080 SPHNCTROTMY ANAL DIV SPHNCTR: CPT | Mod: ,,, | Performed by: STUDENT IN AN ORGANIZED HEALTH CARE EDUCATION/TRAINING PROGRAM

## 2024-05-20 PROCEDURE — 25000003 PHARM REV CODE 250: Performed by: STUDENT IN AN ORGANIZED HEALTH CARE EDUCATION/TRAINING PROGRAM

## 2024-05-20 PROCEDURE — 37000009 HC ANESTHESIA EA ADD 15 MINS: Performed by: STUDENT IN AN ORGANIZED HEALTH CARE EDUCATION/TRAINING PROGRAM

## 2024-05-20 PROCEDURE — 63600175 PHARM REV CODE 636 W HCPCS: Performed by: ANESTHESIOLOGY

## 2024-05-20 PROCEDURE — 37000008 HC ANESTHESIA 1ST 15 MINUTES: Performed by: STUDENT IN AN ORGANIZED HEALTH CARE EDUCATION/TRAINING PROGRAM

## 2024-05-20 PROCEDURE — 25000003 PHARM REV CODE 250: Performed by: NURSE ANESTHETIST, CERTIFIED REGISTERED

## 2024-05-20 RX ORDER — ONDANSETRON 4 MG/1
8 TABLET, ORALLY DISINTEGRATING ORAL EVERY 8 HOURS PRN
Status: DISCONTINUED | OUTPATIENT
Start: 2024-05-20 | End: 2024-05-21 | Stop reason: HOSPADM

## 2024-05-20 RX ORDER — ONDANSETRON 4 MG/1
4 TABLET, FILM COATED ORAL EVERY 6 HOURS PRN
Qty: 40 TABLET | Refills: 0 | Status: SHIPPED | OUTPATIENT
Start: 2024-05-20 | End: 2024-05-30

## 2024-05-20 RX ORDER — KETOROLAC TROMETHAMINE 10 MG/1
10 TABLET, FILM COATED ORAL EVERY 6 HOURS
Qty: 20 TABLET | Refills: 0 | Status: SHIPPED | OUTPATIENT
Start: 2024-05-20 | End: 2024-05-25

## 2024-05-20 RX ORDER — HYDROCODONE BITARTRATE AND ACETAMINOPHEN 10; 325 MG/1; MG/1
1 TABLET ORAL EVERY 6 HOURS PRN
Qty: 28 TABLET | Refills: 0 | Status: SHIPPED | OUTPATIENT
Start: 2024-05-20 | End: 2024-05-20

## 2024-05-20 RX ORDER — LIDOCAINE 50 MG/G
OINTMENT TOPICAL
Status: DISCONTINUED | OUTPATIENT
Start: 2024-05-20 | End: 2024-05-20 | Stop reason: HOSPADM

## 2024-05-20 RX ORDER — MIDAZOLAM HYDROCHLORIDE 1 MG/ML
INJECTION INTRAMUSCULAR; INTRAVENOUS
Status: DISCONTINUED | OUTPATIENT
Start: 2024-05-20 | End: 2024-05-20

## 2024-05-20 RX ORDER — OXYCODONE HYDROCHLORIDE 5 MG/1
5 TABLET ORAL EVERY 4 HOURS PRN
Status: DISCONTINUED | OUTPATIENT
Start: 2024-05-20 | End: 2024-05-21 | Stop reason: HOSPADM

## 2024-05-20 RX ORDER — ACETAMINOPHEN 10 MG/ML
INJECTION, SOLUTION INTRAVENOUS
Status: DISCONTINUED | OUTPATIENT
Start: 2024-05-20 | End: 2024-05-20

## 2024-05-20 RX ORDER — ACETAMINOPHEN 325 MG/1
650 TABLET ORAL EVERY 4 HOURS PRN
Status: DISCONTINUED | OUTPATIENT
Start: 2024-05-20 | End: 2024-05-21 | Stop reason: HOSPADM

## 2024-05-20 RX ORDER — GLYCOPYRROLATE 0.2 MG/ML
INJECTION, SOLUTION INTRAMUSCULAR; INTRAVENOUS
Status: DISCONTINUED | OUTPATIENT
Start: 2024-05-20 | End: 2024-05-20

## 2024-05-20 RX ORDER — LIDOCAINE HYDROCHLORIDE 10 MG/ML
1 INJECTION, SOLUTION EPIDURAL; INFILTRATION; INTRACAUDAL; PERINEURAL ONCE AS NEEDED
Status: DISCONTINUED | OUTPATIENT
Start: 2024-05-20 | End: 2024-05-21 | Stop reason: HOSPADM

## 2024-05-20 RX ORDER — FENTANYL CITRATE 50 UG/ML
INJECTION, SOLUTION INTRAMUSCULAR; INTRAVENOUS
Status: DISCONTINUED | OUTPATIENT
Start: 2024-05-20 | End: 2024-05-20

## 2024-05-20 RX ORDER — MORPHINE SULFATE 10 MG/ML
4 INJECTION INTRAMUSCULAR; INTRAVENOUS; SUBCUTANEOUS EVERY 4 HOURS PRN
Status: DISCONTINUED | OUTPATIENT
Start: 2024-05-20 | End: 2024-05-21 | Stop reason: HOSPADM

## 2024-05-20 RX ORDER — SENNOSIDES 8.6 MG/1
17.2 TABLET ORAL DAILY
COMMUNITY

## 2024-05-20 RX ORDER — ONDANSETRON HYDROCHLORIDE 2 MG/ML
INJECTION, SOLUTION INTRAMUSCULAR; INTRAVENOUS
Status: DISCONTINUED | OUTPATIENT
Start: 2024-05-20 | End: 2024-05-20

## 2024-05-20 RX ORDER — SODIUM CHLORIDE 9 MG/ML
INJECTION, SOLUTION INTRAVENOUS CONTINUOUS
Status: DISCONTINUED | OUTPATIENT
Start: 2024-05-20 | End: 2024-05-20

## 2024-05-20 RX ORDER — BUPIVACAINE HYDROCHLORIDE 5 MG/ML
INJECTION, SOLUTION PERINEURAL
Status: DISCONTINUED | OUTPATIENT
Start: 2024-05-20 | End: 2024-05-20 | Stop reason: HOSPADM

## 2024-05-20 RX ORDER — METOCLOPRAMIDE HYDROCHLORIDE 5 MG/ML
5 INJECTION INTRAMUSCULAR; INTRAVENOUS EVERY 6 HOURS PRN
Status: DISCONTINUED | OUTPATIENT
Start: 2024-05-20 | End: 2024-05-20 | Stop reason: HOSPADM

## 2024-05-20 RX ORDER — SODIUM CHLORIDE 0.9 % (FLUSH) 0.9 %
10 SYRINGE (ML) INJECTION
Status: DISCONTINUED | OUTPATIENT
Start: 2024-05-20 | End: 2024-05-21 | Stop reason: HOSPADM

## 2024-05-20 RX ORDER — HYDROMORPHONE HYDROCHLORIDE 2 MG/ML
0.5 INJECTION, SOLUTION INTRAMUSCULAR; INTRAVENOUS; SUBCUTANEOUS EVERY 5 MIN PRN
Status: DISCONTINUED | OUTPATIENT
Start: 2024-05-20 | End: 2024-05-20 | Stop reason: HOSPADM

## 2024-05-20 RX ORDER — SODIUM CHLORIDE, SODIUM LACTATE, POTASSIUM CHLORIDE, CALCIUM CHLORIDE 600; 310; 30; 20 MG/100ML; MG/100ML; MG/100ML; MG/100ML
INJECTION, SOLUTION INTRAVENOUS CONTINUOUS
Status: DISCONTINUED | OUTPATIENT
Start: 2024-05-20 | End: 2024-05-21 | Stop reason: HOSPADM

## 2024-05-20 RX ORDER — PROPOFOL 10 MG/ML
VIAL (ML) INTRAVENOUS
Status: DISCONTINUED | OUTPATIENT
Start: 2024-05-20 | End: 2024-05-20

## 2024-05-20 RX ORDER — MORPHINE SULFATE 4 MG/ML
4 INJECTION, SOLUTION INTRAMUSCULAR; INTRAVENOUS EVERY 5 MIN PRN
Status: DISCONTINUED | OUTPATIENT
Start: 2024-05-20 | End: 2024-05-20 | Stop reason: HOSPADM

## 2024-05-20 RX ORDER — METHOCARBAMOL 750 MG/1
750 TABLET, FILM COATED ORAL 4 TIMES DAILY
Status: DISCONTINUED | OUTPATIENT
Start: 2024-05-20 | End: 2024-05-21 | Stop reason: HOSPADM

## 2024-05-20 RX ORDER — ONDANSETRON HYDROCHLORIDE 2 MG/ML
4 INJECTION, SOLUTION INTRAVENOUS EVERY 12 HOURS PRN
Status: DISCONTINUED | OUTPATIENT
Start: 2024-05-20 | End: 2024-05-20 | Stop reason: HOSPADM

## 2024-05-20 RX ORDER — HYDROCODONE BITARTRATE AND ACETAMINOPHEN 10; 325 MG/1; MG/1
1 TABLET ORAL EVERY 6 HOURS PRN
Qty: 28 TABLET | Refills: 0 | Status: SHIPPED | OUTPATIENT
Start: 2024-05-20 | End: 2024-05-27

## 2024-05-20 RX ORDER — METHOCARBAMOL 750 MG/1
750 TABLET, FILM COATED ORAL 4 TIMES DAILY
Qty: 40 TABLET | Refills: 0 | Status: SHIPPED | OUTPATIENT
Start: 2024-05-20 | End: 2024-05-30

## 2024-05-20 RX ORDER — NEOSTIGMINE METHYLSULFATE 5 MG/5 ML
SYRINGE (ML) INTRAVENOUS
Status: DISCONTINUED | OUTPATIENT
Start: 2024-05-20 | End: 2024-05-20

## 2024-05-20 RX ORDER — ONDANSETRON HYDROCHLORIDE 2 MG/ML
4 INJECTION, SOLUTION INTRAVENOUS DAILY PRN
Status: DISCONTINUED | OUTPATIENT
Start: 2024-05-20 | End: 2024-05-20 | Stop reason: HOSPADM

## 2024-05-20 RX ORDER — KETOROLAC TROMETHAMINE 30 MG/ML
15 INJECTION, SOLUTION INTRAMUSCULAR; INTRAVENOUS EVERY 6 HOURS
Status: DISCONTINUED | OUTPATIENT
Start: 2024-05-20 | End: 2024-05-21 | Stop reason: HOSPADM

## 2024-05-20 RX ORDER — ACETAMINOPHEN 325 MG/1
650 TABLET ORAL EVERY 8 HOURS PRN
Status: DISCONTINUED | OUTPATIENT
Start: 2024-05-20 | End: 2024-05-21 | Stop reason: HOSPADM

## 2024-05-20 RX ORDER — TALC
6 POWDER (GRAM) TOPICAL NIGHTLY PRN
Status: DISCONTINUED | OUTPATIENT
Start: 2024-05-20 | End: 2024-05-21 | Stop reason: HOSPADM

## 2024-05-20 RX ORDER — ROCURONIUM BROMIDE 10 MG/ML
INJECTION, SOLUTION INTRAVENOUS
Status: DISCONTINUED | OUTPATIENT
Start: 2024-05-20 | End: 2024-05-20

## 2024-05-20 RX ORDER — LIDOCAINE HYDROCHLORIDE 10 MG/ML
INJECTION, SOLUTION INTRAVENOUS
Status: DISCONTINUED | OUTPATIENT
Start: 2024-05-20 | End: 2024-05-20

## 2024-05-20 RX ORDER — DIPHENHYDRAMINE HYDROCHLORIDE 50 MG/ML
25 INJECTION INTRAMUSCULAR; INTRAVENOUS EVERY 6 HOURS PRN
Status: DISCONTINUED | OUTPATIENT
Start: 2024-05-20 | End: 2024-05-20 | Stop reason: HOSPADM

## 2024-05-20 RX ADMIN — ONDANSETRON 4 MG: 2 INJECTION INTRAMUSCULAR; INTRAVENOUS at 01:05

## 2024-05-20 RX ADMIN — GLYCOPYRROLATE 0.8 MG: 0.2 INJECTION, SOLUTION INTRAMUSCULAR; INTRAVENOUS at 03:05

## 2024-05-20 RX ADMIN — FENTANYL CITRATE 100 MCG: 50 INJECTION, SOLUTION INTRAMUSCULAR; INTRAVENOUS at 01:05

## 2024-05-20 RX ADMIN — MORPHINE SULFATE 4 MG: 4 INJECTION, SOLUTION INTRAMUSCULAR; INTRAVENOUS at 04:05

## 2024-05-20 RX ADMIN — METHOCARBAMOL TABLETS 750 MG: 750 TABLET, COATED ORAL at 09:05

## 2024-05-20 RX ADMIN — MORPHINE SULFATE 4 MG: 4 INJECTION, SOLUTION INTRAMUSCULAR; INTRAVENOUS at 03:05

## 2024-05-20 RX ADMIN — ROCURONIUM BROMIDE 20 MG: 10 INJECTION, SOLUTION INTRAVENOUS at 02:05

## 2024-05-20 RX ADMIN — ROCURONIUM BROMIDE 30 MG: 10 INJECTION, SOLUTION INTRAVENOUS at 02:05

## 2024-05-20 RX ADMIN — SODIUM CHLORIDE, POTASSIUM CHLORIDE, SODIUM LACTATE AND CALCIUM CHLORIDE: 600; 310; 30; 20 INJECTION, SOLUTION INTRAVENOUS at 05:05

## 2024-05-20 RX ADMIN — CEFAZOLIN 2 G: 2 INJECTION, POWDER, FOR SOLUTION INTRAMUSCULAR; INTRAVENOUS at 01:05

## 2024-05-20 RX ADMIN — SODIUM CHLORIDE: 9 INJECTION, SOLUTION INTRAVENOUS at 12:05

## 2024-05-20 RX ADMIN — FENTANYL CITRATE 50 MCG: 50 INJECTION, SOLUTION INTRAMUSCULAR; INTRAVENOUS at 03:05

## 2024-05-20 RX ADMIN — ACETAMINOPHEN 1000 MG: 10 INJECTION, SOLUTION INTRAVENOUS at 02:05

## 2024-05-20 RX ADMIN — Medication 5 MG: at 03:05

## 2024-05-20 RX ADMIN — METHOCARBAMOL TABLETS 750 MG: 750 TABLET, COATED ORAL at 05:05

## 2024-05-20 RX ADMIN — LIDOCAINE HYDROCHLORIDE 50 MG: 10 INJECTION, SOLUTION INTRAVENOUS at 02:05

## 2024-05-20 RX ADMIN — MIDAZOLAM 2 MG: 1 INJECTION INTRAMUSCULAR; INTRAVENOUS at 01:05

## 2024-05-20 RX ADMIN — KETOROLAC TROMETHAMINE 15 MG: 30 INJECTION, SOLUTION INTRAMUSCULAR; INTRAVENOUS at 05:05

## 2024-05-20 RX ADMIN — Medication 200 MG: at 02:05

## 2024-05-20 RX ADMIN — ONDANSETRON 4 MG: 2 INJECTION INTRAMUSCULAR; INTRAVENOUS at 04:05

## 2024-05-20 RX ADMIN — OXYCODONE HYDROCHLORIDE 5 MG: 5 TABLET ORAL at 09:05

## 2024-05-20 RX ADMIN — FENTANYL CITRATE 50 MCG: 50 INJECTION, SOLUTION INTRAMUSCULAR; INTRAVENOUS at 02:05

## 2024-05-20 NOTE — NURSING
Patient opting to stay overnight to ensure adequate pain control.  Patient is currently resting comfortably in bed at this time.  Will continue to monitor.

## 2024-05-20 NOTE — DISCHARGE SUMMARY
[facility] - Surgery  Discharge Note  Short Stay    Procedure(s) (LRB):  SPHINCTEROTOMY, ANAL, INTERNAL, LATERAL (N/A)      OUTCOME: Patient tolerated treatment/procedure well without complication and is now ready for discharge.    DISPOSITION: Home or Self Care    FINAL DIAGNOSIS:  Anal fissure    FOLLOWUP: In clinic    DISCHARGE INSTRUCTIONS:    Discharge Procedure Orders   Diet Adult Regular   Order Comments: Pain medication may cause constipation   Avoid straining or prolonged time sitting on the toilet   MiraLax 3 times a day  Colace once a day   Mineral oil 2 times a day   Bowel regimen may be modified to avoid diarrhea     Lifting restrictions   Order Comments: No lifting, pushing, or pulling greater than 10lbs for 4 weeks     No dressing needed   Order Comments: You have a rectal dressing in place   This will fall out after your first bowel movement  If it does not fall out by the morning of 5/21, you may pull it out using the black string    Cleanse backside after every bowel movement - you may do this with a bidet, shower, hand shower, or submerging your backside in a shallow tub of water (Sitz bath)  Submerge backside in shallow bath of warm water (Sitz baths) up to four times a day as needed for comfort     Do not insert anything into the rectum for 1 month - this includes douches, fingers, suppositories, or any other foreign objects     Notify your health care provider if you experience any of the following:  temperature >100.4     Notify your health care provider if you experience any of the following:  persistent nausea and vomiting or diarrhea     Notify your health care provider if you experience any of the following:  severe uncontrolled pain     Notify your health care provider if you experience any of the following:  redness, tenderness, or signs of infection (pain, swelling, redness, odor or green/yellow discharge around incision site)     Notify your health care provider if you experience any of  the following:   Order Comments: A small amount of bleeding with bowel movements is to be expected for the first few days after surgery  Please call the hospital  (if on the weekend or after hours) or notify Dr. Hernandez's staff at [phone] if being to experience large amounts of bleeding - enough to fill up half a measuring cup     Activity as tolerated   Order Comments: OK to walk, bathe, and prepare food.  No strenuous exercise.        TIME SPENT ON DISCHARGE: 8 minutes

## 2024-05-20 NOTE — INTERVAL H&P NOTE
Patient seen and examined.  No interval change since the below obtained H&P  Informed consent verified and surgical site marked  NPO since midnight  All questions and concerns addressed  To OR for EUA with lateral sphincterotomy for recurrent anal fissure     Katie Hernandez MD  General Surgery   [phone]

## 2024-05-20 NOTE — OP NOTE
[facility] - OR Periop Services  General Surgery Department  Operative Note    SUMMARY     Date of Procedure: 5/20/2024    Procedure: Procedure(s) (LRB):  SPHINCTEROTOMY, ANAL, INTERNAL, LATERAL:      Surgeon(s) and Role:  Katie Hernandez MD     Pre-Operative Diagnosis: Pre-Op Diagnosis Codes:     * Anal fissure [K60.2]    Post-Operative Diagnosis: Same         Anesthesia: Choice    Findings:  Large posterior midline anal fissure  Left lateral internal sphincterotomy performed  Right lateral grade II internal hemorrhoids    Indications for the Procedure:  40yo who presents with anal fissure after failure of medical management.     Operative Conduct in Detail:   Patient was seen in holding where all questions and concerns were addressed.  Patient was wheeled to the operating room placed in supine position where general anesthesia was administered.  He was then rolled into the prone jennifer-knife position ensuring that all pressure points were properly protected.  The perianal area was prepped and draped in sterile fashion.  A time-out was performed in the operating room with all present and in agreement.  Preoperative antibiotics were given in day surgery.     Digital rectal exam with serial dilation of the anus was performed.  Anal bullet retractor was inserted noting a 1 x 1 cm posterior midline anal fissure and right lateral and midline anterior grade 2 internal hemorrhoids without ulceration.  A linear incision in the left lateral position was created from the dentate line down to immediately past the anal verge.  The muscles of the internal and external sphincters were exposed.  The internal sphincter was cut with a curved Saldana scissors.  The anorectal mucosa was then reapproximated with a 3-0 chromic interlocking suture.  Devitalized tissue around the chronic anal fissure was debrided and the mucosa reapproximated with interrupted 3-0 chromic sutures.  Hemostatic rectal tampon was placed.  Patient was then  awakened from anesthesia and taken to PACU in stable condition.  All lap counts were correct x2 prior to and after will closure.    Complications: No    Estimated Blood Loss (EBL): 5cc           Implants: * No implants in log *    Specimens:   * No specimens in log *           Condition: Good    Disposition: PACU - hemodynamically stable.        Katie Hernandez MD  General Surgery   [phone]

## 2024-05-20 NOTE — TRANSFER OF CARE
Anesthesia Transfer of Care Note    Patient: Lb Soto    Procedure(s) Performed: Procedure(s) (LRB):  SPHINCTEROTOMY, ANAL, INTERNAL, LATERAL (N/A)    Patient location: PACU    Anesthesia Type: general    Transport from OR: Transported from OR on room air with adequate spontaneous ventilation    Post pain: adequate analgesia    Post assessment: no apparent anesthetic complications    Post vital signs: stable    Level of consciousness: awake and alert    Nausea/Vomiting: no nausea/vomiting    Complications: none    Transfer of care protocol was followed      Last vitals:     /65  P 82  R 14  T 98  O2 Sat 98%

## 2024-05-20 NOTE — NURSING
Patient arrived to room 648 via bed with nurse.  Patient is a,a,ox4, VSS, and has complaints of mild pain to rectum at this time.  Patient now resting comfortably in bed locked in lowest position, side rails up x3, and call bell in reach.  Will continue to monitor.

## 2024-05-21 VITALS
HEART RATE: 51 BPM | DIASTOLIC BLOOD PRESSURE: 54 MMHG | SYSTOLIC BLOOD PRESSURE: 96 MMHG | RESPIRATION RATE: 14 BRPM | TEMPERATURE: 99 F | HEIGHT: 69 IN | BODY MASS INDEX: 25.92 KG/M2 | WEIGHT: 175 LBS | OXYGEN SATURATION: 98 %

## 2024-05-21 PROCEDURE — 25000003 PHARM REV CODE 250: Performed by: STUDENT IN AN ORGANIZED HEALTH CARE EDUCATION/TRAINING PROGRAM

## 2024-05-21 PROCEDURE — 63600175 PHARM REV CODE 636 W HCPCS: Performed by: STUDENT IN AN ORGANIZED HEALTH CARE EDUCATION/TRAINING PROGRAM

## 2024-05-21 RX ADMIN — METHOCARBAMOL TABLETS 750 MG: 750 TABLET, COATED ORAL at 08:05

## 2024-05-21 RX ADMIN — SODIUM CHLORIDE, POTASSIUM CHLORIDE, SODIUM LACTATE AND CALCIUM CHLORIDE: 600; 310; 30; 20 INJECTION, SOLUTION INTRAVENOUS at 02:05

## 2024-05-21 RX ADMIN — KETOROLAC TROMETHAMINE 15 MG: 30 INJECTION, SOLUTION INTRAMUSCULAR; INTRAVENOUS at 05:05

## 2024-05-21 NOTE — PLAN OF CARE
Discharge instructions given to patient.  Patient verbalized understanding and had no further questions.  Patient's IV removed and vital signs within normal limits.  Patient now leaving floor on wheelchair with PCT and family.

## 2024-05-21 NOTE — PLAN OF CARE
Pt doing well. Reports mild rectal discomfort. Reports passing gas. No bm yet. Stable for discharge home. Discharge instructions reviewed and medications sent.

## 2024-05-21 NOTE — ANESTHESIA POSTPROCEDURE EVALUATION
Anesthesia Post Evaluation    Patient: Lb Soto    Procedure(s) Performed: Procedure(s) (LRB):  SPHINCTEROTOMY, ANAL, INTERNAL, LATERAL (N/A)    Final Anesthesia Type: general      Patient location during evaluation: PACU  Patient participation: Yes- Able to Participate  Level of consciousness: awake and alert and oriented  Post-procedure vital signs: reviewed and stable  Pain management: adequate  Airway patency: patent    PONV status at discharge: No PONV  Anesthetic complications: no      Cardiovascular status: blood pressure returned to baseline  Respiratory status: spontaneous ventilation, room air and unassisted  Hydration status: euvolemic  Follow-up not needed.              Vitals Value Taken Time   BP 96/54 05/21/24 0740   Temp 37 °C (98.6 °F) 05/21/24 0740   Pulse 51 05/21/24 0740   Resp 14 05/21/24 0740   SpO2 98 % 05/21/24 0740         Event Time   Out of Recovery 16:33:08         Pain/Wai Score: Pain Rating Prior to Med Admin: 7 (5/21/2024  5:03 AM)  Pain Rating Post Med Admin: 0 (5/21/2024  5:33 AM)  Wai Score: 9 (5/20/2024  4:30 PM)

## 2024-05-21 NOTE — PLAN OF CARE
Plan of care discussed with pt, complains of pain to surgical site at times this shift, pain meds as ordered, no drainage noted to surgical site, on continuous IV fluids as ordered, turns self in bed, able to make needs known, AAOx4, continent, pt. concerned about having to have a BM after surgery, pt. Had BM prior to surgery, no SOB, denies respiratory issues, no respiratory distress, call bell in reach, will continue to monitor.       Problem: Adult Inpatient Plan of Care  Goal: Plan of Care Review  Outcome: Progressing  Goal: Patient-Specific Goal (Individualized)  Outcome: Progressing  Goal: Absence of Hospital-Acquired Illness or Injury  Outcome: Progressing  Goal: Optimal Comfort and Wellbeing  Outcome: Progressing  Goal: Readiness for Transition of Care  Outcome: Progressing     Problem: Wound  Goal: Optimal Coping  Outcome: Progressing  Goal: Optimal Functional Ability  Outcome: Progressing  Goal: Absence of Infection Signs and Symptoms  Outcome: Progressing  Goal: Improved Oral Intake  Outcome: Progressing  Goal: Optimal Pain Control and Function  Outcome: Progressing  Goal: Skin Health and Integrity  Outcome: Progressing  Goal: Optimal Wound Healing  Outcome: Progressing

## 2024-05-21 NOTE — ANESTHESIA PROCEDURE NOTES
Intubation    Date/Time: 5/20/2024 2:08 PM    Performed by: Shon Morales CRNA  Authorized by: Lb John CRNA    Intubation:     Induction:  Intravenous    Mask Ventilation:  Not attempted    Attempts:  1    Attempted By:  CRNA    Method of Intubation:  Direct    Blade:  Yoon 4    Difficult Airway Encountered?: No      Complications:  None    Airway Device:  Oral endotracheal tube    Airway Device Size:  7.5    Inflation Amount (mL):  6    Secured at:  The lips    Placement Verified By:  Capnometry    Complicating Factors:  None    Findings Post-Intubation:  Atraumatic/condition of teeth unchanged and BS equal bilateral

## 2024-06-04 ENCOUNTER — OFFICE VISIT (OUTPATIENT)
Dept: SURGERY | Facility: CLINIC | Age: 39
End: 2024-06-04
Payer: COMMERCIAL

## 2024-06-04 VITALS
SYSTOLIC BLOOD PRESSURE: 95 MMHG | DIASTOLIC BLOOD PRESSURE: 53 MMHG | HEART RATE: 73 BPM | OXYGEN SATURATION: 97 % | WEIGHT: 173.06 LBS | HEIGHT: 69 IN | BODY MASS INDEX: 25.63 KG/M2

## 2024-06-04 DIAGNOSIS — K60.2 ANAL FISSURE: Primary | ICD-10-CM

## 2024-06-04 PROCEDURE — 3044F HG A1C LEVEL LT 7.0%: CPT | Mod: CPTII,S$GLB,, | Performed by: STUDENT IN AN ORGANIZED HEALTH CARE EDUCATION/TRAINING PROGRAM

## 2024-06-04 PROCEDURE — 99024 POSTOP FOLLOW-UP VISIT: CPT | Mod: S$GLB,,, | Performed by: STUDENT IN AN ORGANIZED HEALTH CARE EDUCATION/TRAINING PROGRAM

## 2024-06-04 PROCEDURE — 3078F DIAST BP <80 MM HG: CPT | Mod: CPTII,S$GLB,, | Performed by: STUDENT IN AN ORGANIZED HEALTH CARE EDUCATION/TRAINING PROGRAM

## 2024-06-04 PROCEDURE — 3074F SYST BP LT 130 MM HG: CPT | Mod: CPTII,S$GLB,, | Performed by: STUDENT IN AN ORGANIZED HEALTH CARE EDUCATION/TRAINING PROGRAM

## 2024-06-04 PROCEDURE — 1159F MED LIST DOCD IN RCRD: CPT | Mod: CPTII,S$GLB,, | Performed by: STUDENT IN AN ORGANIZED HEALTH CARE EDUCATION/TRAINING PROGRAM

## 2024-06-04 PROCEDURE — 99999 PR PBB SHADOW E&M-EST. PATIENT-LVL III: CPT | Mod: PBBFAC,,, | Performed by: STUDENT IN AN ORGANIZED HEALTH CARE EDUCATION/TRAINING PROGRAM

## 2024-06-15 NOTE — PROGRESS NOTES
"[facility]  General Surgery Clinic Progress Note    HPI:  Lb Soto is a 39 y.o. male with history of recurrent anal fissure s/p lateral sphincterotomy who presents for follow up.  Complaint with bowel regimen.  Says pain with defecation gradually improving.  Denies bleeding.      ROS:  Negative except above    PE:  Vitals:    06/04/24 1334   BP: (!) 95/53   Pulse: 73   SpO2: 97%   Weight: 78.5 kg (173 lb 1 oz)   Height: 5' 9" (1.753 m)        Gen: Alert and oriented x3, judgement intact, NAD  CV: RRR  Resp: CTAB; breaths non-labored and symmetrical  Abd: Soft, NT, ND, BS+  Extremities: No c/c/e  JOHN: deferred    A/P:  39 y.o. male s/p lateral sphincterotomy for recurrent anal fissure who presents for follow up  -Continue bowel regimen  -No anal manipulation for until next clinic visit  -RTC 1 month    Katie Hernandez MD  General Surgery   [phone]        6/15/2024  3:28 PM  "

## 2024-07-09 ENCOUNTER — OFFICE VISIT (OUTPATIENT)
Dept: SURGERY | Facility: CLINIC | Age: 39
End: 2024-07-09
Payer: COMMERCIAL

## 2024-07-09 DIAGNOSIS — K60.2 ANAL FISSURE: Primary | ICD-10-CM

## 2024-07-09 DIAGNOSIS — Z98.890 H/O RECTAL SPHINCTEROTOMY: ICD-10-CM

## 2024-07-09 PROCEDURE — 3044F HG A1C LEVEL LT 7.0%: CPT | Mod: CPTII,S$GLB,, | Performed by: STUDENT IN AN ORGANIZED HEALTH CARE EDUCATION/TRAINING PROGRAM

## 2024-07-09 PROCEDURE — 99024 POSTOP FOLLOW-UP VISIT: CPT | Mod: S$GLB,,, | Performed by: STUDENT IN AN ORGANIZED HEALTH CARE EDUCATION/TRAINING PROGRAM

## 2024-07-23 PROBLEM — Z98.890: Status: ACTIVE | Noted: 2024-07-23

## 2024-07-23 PROBLEM — K60.2 ANAL FISSURE: Status: RESOLVED | Noted: 2024-03-25 | Resolved: 2024-07-23

## 2024-07-23 NOTE — PROGRESS NOTES
[facility]  General Surgery Clinic Progress Note    HPI:  Lb Soto is a 39 y.o. male with history of recurrent anal fissure s/p lateral sphincterotomy who presents for follow up.  Still compliant with bowel regimen and having daily nonpainful bowel movements.  Voices no complaints    ROS:  Negative except above    PE:  There were no vitals filed for this visit.       Gen: Alert and oriented x3, judgement intact, NAD  CV: RRR  Resp: CTAB; breaths non-labored and symmetrical  Abd: Soft, NT, ND, BS+  Extremities: No c/c/e  JOHN: deferred    A/P:  39 y.o. male s/p lateral sphincterotomy for recurrent anal fissure who presents for follow up  -no signs of recurrence  -okay to resume physical activity  -patient was advised to indefinitely continue bowel regimen to avoid constipation and straining  -return to clinic p.ilda Hernandez MD  General Surgery   [phone]        7/23/2024  3:28 PM

## (undated) DEVICE — TRAY SKIN SCRUB WET PREMIUM

## (undated) DEVICE — SUT CHROMIC 3-0 SH 27IN GUT

## (undated) DEVICE — GLOVE SURGICAL LATEX SZ 6.5

## (undated) DEVICE — SUT SILK 0 SH 30IN BLK BR

## (undated) DEVICE — STAPLER SKIN PROXIMATE WIDE

## (undated) DEVICE — HEMOSTAT SURGICEL 2X14IN

## (undated) DEVICE — COVER OVERHEAD SURG LT BLUE

## (undated) DEVICE — PAD ABDOMINAL STERILE 5X9IN

## (undated) DEVICE — Device

## (undated) DEVICE — ELECTRODE NDL EDGE 2 5/6IN

## (undated) DEVICE — JELLY SURGILUBE CARBMR FRE 2OZ

## (undated) DEVICE — LINER GLOVE POWDERFREE SZ 6.5

## (undated) DEVICE — BLANKET UPPER BODY 78.7X29.9IN

## (undated) DEVICE — GLOVE SURG ULTRA TOUCH 7

## (undated) DEVICE — ADHESIVE MASTISOL VIAL 48/BX

## (undated) DEVICE — UNDERPAD DELUXE FLUFF 30X30IN

## (undated) DEVICE — LINER SUCTION CANNISTER REGUGA

## (undated) DEVICE — SPONGE SURGIFOAM GELATIN SZ50

## (undated) DEVICE — STRAP KNEE & BODY DISP 4X34IN

## (undated) DEVICE — ELECTRODE REM PLYHSV RETURN 9

## (undated) DEVICE — LINER GLOVE POWDERFREE SZ 7

## (undated) DEVICE — SOL NACL IRR 1000ML BTL